# Patient Record
(demographics unavailable — no encounter records)

---

## 2025-03-05 NOTE — CONSULT LETTER
[Dear  ___] : Dear  [unfilled], [Courtesy Letter:] : I had the pleasure of seeing your patient, [unfilled], in my office today. [Please see my note below.] : Please see my note below. [Sincerely,] : Sincerely, [DrRadha  ___] : Dr. DELA CRUZ [FreeTextEntry3] : Marine Patel, RPA-C Breast Surgery [address] [place] [address], [place] [address] (Phone) [phone] (Fax) [phone]

## 2025-03-05 NOTE — HISTORY OF PRESENT ILLNESS
[FreeTextEntry1] : Patient is a 40 year old female here today for a follow up visit. She has a history of left non-bloody spontaneous nipple discharge in 2020.  She also has a history of increased prolactin levels. She has not had further follow up for this. In October 2023 she started having left breast pain and felt the lower breast was hard. She saw me. On exam, she had bilateral palpable breast masses consistent with cysts. She also reported 2 episodes of expressible clear right nipple discharge. Pt notes irregular periods. 1/29/2025 Bilateral mammogram: John Lifetime Risk: 27.7%. The breasts are extremely dense, which lowers the sensitivity of mammography. No suspicious mass, suspicious microcalcifications, or other sign of malignancy is identified.  There is an oval circumscribed mass noted in the retroareolar region of the right breast favored to be simple cysts as seen on concurrent mammogram measuring up to 3.5 cm. Benign type calcifications noted in both breasts. BREAST ARTERIAL CALCIFICATION (RANDY): Grade 0 - No vascular calcifications. Note: The absence of breast arterial calcification does not exclude cardiovascular disease. Management of cardiovascular risk factors should be based clinically. Bilateral ultrasound: RIGHT BREAST: At 1:00 N4 there is a hypoechoic mass measuring 0.9 x 0.7 x 0.8 cm. Repeat/real-time breast ultrasound recommended. At 9:00 N7 there is a round hypoechoic mass measuring 0.5 x 0.6 x 0.4 cm. Repeat/real-time breast ultrasound recommended. Simple appearing cysts are noted. LEFT BREAST: No suspicious solid or cystic mass.  Simple cysts are noted. BI-RADS 0, advise right repeat/real-time breast ultrasound  2/20/2025 Right targeted breast ultrasound: No suspicious solid mass. 1 o'clock 4 cm from the nipple is a cyst with septation measuring 7 x 8 x 9 mm. A similar appearing finding is visualized on the sonogram of 11/14/2023 labeled upper inner quadrant. 9 o'clock 7 cm from the nipple is a complicated cyst measuring 5 x 4 x 5 mm. A similar appearing nodule is visualized on the sonogram of 11/14/2023 labeled 9 o'clock. BI-RADS 2 She notes the palpable right breast cysts with some intermittent right breast discomfort. She had 1 episode of expressible milky discharge in November.  Up to date with MDs

## 2025-03-05 NOTE — CONSULT LETTER
[Dear  ___] : Dear  [unfilled], [Courtesy Letter:] : I had the pleasure of seeing your patient, [unfilled], in my office today. [Please see my note below.] : Please see my note below. [Sincerely,] : Sincerely, [DrRadha  ___] : Dr. DELA CRUZ [FreeTextEntry3] : Marine Patel, RPA-C Breast Surgery [address] [place] [address], [place] [address] (Phone) [phone] (Fax) [phone]

## 2025-03-05 NOTE — PROCEDURE
[FreeTextEntry1] : targeted ultrasound of right 10:00, right 11:00, and left 1:00 areas [FreeTextEntry3] : left breast 1:00 (N4.5cm): 2.82 x 1.54 cm right breast 11:00 (N4cm): 2.49 x 1.24 cm right breast 10:00 (N7cm): no cyst on ultrasound; normal breast tissue

## 2025-03-05 NOTE — CONSULT LETTER
[Dear  ___] : Dear  [unfilled], [Courtesy Letter:] : I had the pleasure of seeing your patient, [unfilled], in my office today. [Please see my note below.] : Please see my note below. [Sincerely,] : Sincerely, [DrRadha  ___] : Dr. DELA CRUZ [FreeTextEntry3] : Marine Patel, RPA-C Breast Surgery [address] [place] [address], [place] [address] (Phone) [phone] (Fax) [phone]

## 2025-03-05 NOTE — PHYSICAL EXAM
[Normocephalic] : normocephalic [Atraumatic] : atraumatic [EOMI] : extra ocular movement intact [Sclera nonicteric] : sclera nonicteric [Supple] : supple [No Supraclavicular Adenopathy] : no supraclavicular adenopathy [Examined in the supine and seated position] : examined in the supine and seated position [Asymmetrical] : asymmetrical [No Nipple Retraction] : no left nipple retraction [No Nipple Discharge] : no left nipple discharge [No Axillary Lymphadenopathy] : no left axillary lymphadenopathy [No Edema] : no edema [No Rashes] : no rashes [No Ulceration] : no ulceration [No dominant masses] : no dominant masses left breast [de-identified] : Her right breast is larger than her left. Bilateral dense FCC. [de-identified] : Palpable areas: 10:00 (N6cm) 3.2 x 3.2 cm, consistent with normal appearing tissue on ultrasound, and 11:00 (N4cm) area with tenderness 2.5 x 2.3 cm c/w cyst on ultrasound. [de-identified] : No palpable cysts on today's exam.

## 2025-03-05 NOTE — PHYSICAL EXAM
[Normocephalic] : normocephalic [Atraumatic] : atraumatic [EOMI] : extra ocular movement intact [Sclera nonicteric] : sclera nonicteric [Supple] : supple [No Supraclavicular Adenopathy] : no supraclavicular adenopathy [Examined in the supine and seated position] : examined in the supine and seated position [Asymmetrical] : asymmetrical [No Nipple Retraction] : no left nipple retraction [No Nipple Discharge] : no left nipple discharge [No Axillary Lymphadenopathy] : no left axillary lymphadenopathy [No Edema] : no edema [No Rashes] : no rashes [No Ulceration] : no ulceration [No dominant masses] : no dominant masses left breast [de-identified] : Her right breast is larger than her left. Bilateral dense FCC. [de-identified] : Palpable areas: 10:00 (N6cm) 3.2 x 3.2 cm, consistent with normal appearing tissue on ultrasound, and 11:00 (N4cm) area with tenderness 2.5 x 2.3 cm c/w cyst on ultrasound. [de-identified] : No palpable cysts on today's exam.

## 2025-03-05 NOTE — PHYSICAL EXAM
[Normocephalic] : normocephalic [Atraumatic] : atraumatic [EOMI] : extra ocular movement intact [Sclera nonicteric] : sclera nonicteric [Supple] : supple [No Supraclavicular Adenopathy] : no supraclavicular adenopathy [Examined in the supine and seated position] : examined in the supine and seated position [Asymmetrical] : asymmetrical [No Nipple Retraction] : no left nipple retraction [No Nipple Discharge] : no left nipple discharge [No Axillary Lymphadenopathy] : no left axillary lymphadenopathy [No Edema] : no edema [No Rashes] : no rashes [No Ulceration] : no ulceration [No dominant masses] : no dominant masses left breast [de-identified] : Her right breast is larger than her left. Bilateral dense FCC. [de-identified] : Palpable areas: 10:00 (N6cm) 3.2 x 3.2 cm, consistent with normal appearing tissue on ultrasound, and 11:00 (N4cm) area with tenderness 2.5 x 2.3 cm c/w cyst on ultrasound. [de-identified] : No palpable cysts on today's exam.

## 2025-03-05 NOTE — ASSESSMENT
[FreeTextEntry1] : Right palpable masses clinically consistent with cysts/benign.  1. Annual bilateral mammogram and bilateral ultrasound due 1/2026 2. Follow up office visit due in 1 year 3. Advised monthly self breast examinations and advised her to contact me if she has any concerns.